# Patient Record
Sex: FEMALE | Race: WHITE | URBAN - NONMETROPOLITAN AREA
[De-identification: names, ages, dates, MRNs, and addresses within clinical notes are randomized per-mention and may not be internally consistent; named-entity substitution may affect disease eponyms.]

---

## 2022-09-28 ENCOUNTER — OFFICE VISIT (OUTPATIENT)
Dept: URBAN - NONMETROPOLITAN AREA CLINIC 1 | Facility: CLINIC | Age: 72
End: 2022-09-28
Payer: COMMERCIAL

## 2022-09-28 DIAGNOSIS — H25.13 AGE-RELATED NUCLEAR CATARACT, BILATERAL: Primary | ICD-10-CM

## 2022-09-28 DIAGNOSIS — D31.32 BENIGN NEOPLASM OF LEFT CHOROID: ICD-10-CM

## 2022-09-28 DIAGNOSIS — H43.811 VITREOUS DEGENERATION, RIGHT EYE: ICD-10-CM

## 2022-09-28 DIAGNOSIS — H35.373 PUCKERING OF MACULA, BILATERAL: ICD-10-CM

## 2022-09-28 PROCEDURE — 92082 INTERMEDIATE VISUAL FIELD XM: CPT | Performed by: OPTOMETRIST

## 2022-09-28 PROCEDURE — 99203 OFFICE O/P NEW LOW 30 MIN: CPT | Performed by: OPTOMETRIST

## 2022-09-28 ASSESSMENT — INTRAOCULAR PRESSURE
OS: 17
OD: 15

## 2022-09-28 ASSESSMENT — VISUAL ACUITY
OS: 20/30
OD: 20/20

## 2022-09-28 ASSESSMENT — KERATOMETRY
OD: 45.63
OS: 45.38

## 2022-09-28 NOTE — IMPRESSION/PLAN
Impression: Age-related nuclear cataract, bilateral: H25.13. Plan: Educated pt on exam findings. Vision minimally affected at this time, did not recommend surgery at this time. Educated pt RTC sooner than next scheduled exam if pt becomes symptomatic to the glare, halos, or blur. Updated and finalized SRx.

## 2022-09-28 NOTE — IMPRESSION/PLAN
Impression: Benign neoplasm of left choroid: D31.32. Plan: Educated pt on exam findings. Educated pt on freckle in back of eye, and likely long standing. Monitor yearly with optos. Educated pt RTC asap if changes in vision noted prior.

## 2022-09-28 NOTE — IMPRESSION/PLAN
Impression: Puckering of macula, bilateral: H35.373. Plan: Educated pt on exam findings. Educated pt ERM is present and likely the attributing factor to decreased clarity. Educated pt an ERM is a wrinkling of the macula and can cause distortion of vision. Surgical options are available and include a vitrectomy with membrane peeling. Educated pt surgical recommendations are not recommended at this time. Educated pt RTC sooner if changes in vision noted prior.